# Patient Record
Sex: MALE | Race: BLACK OR AFRICAN AMERICAN | HISPANIC OR LATINO | URBAN - METROPOLITAN AREA
[De-identification: names, ages, dates, MRNs, and addresses within clinical notes are randomized per-mention and may not be internally consistent; named-entity substitution may affect disease eponyms.]

---

## 2020-12-18 ENCOUNTER — EMERGENCY (EMERGENCY)
Facility: HOSPITAL | Age: 30
LOS: 0 days | Discharge: HOME | End: 2020-12-18
Attending: EMERGENCY MEDICINE | Admitting: EMERGENCY MEDICINE
Payer: COMMERCIAL

## 2020-12-18 VITALS
DIASTOLIC BLOOD PRESSURE: 83 MMHG | SYSTOLIC BLOOD PRESSURE: 135 MMHG | RESPIRATION RATE: 14 BRPM | OXYGEN SATURATION: 96 % | TEMPERATURE: 99 F | HEART RATE: 90 BPM

## 2020-12-18 DIAGNOSIS — M79.10 MYALGIA, UNSPECIFIED SITE: ICD-10-CM

## 2020-12-18 DIAGNOSIS — R51.9 HEADACHE, UNSPECIFIED: ICD-10-CM

## 2020-12-18 DIAGNOSIS — R05 COUGH: ICD-10-CM

## 2020-12-18 DIAGNOSIS — Z20.828 CONTACT WITH AND (SUSPECTED) EXPOSURE TO OTHER VIRAL COMMUNICABLE DISEASES: ICD-10-CM

## 2020-12-18 PROCEDURE — 99284 EMERGENCY DEPT VISIT MOD MDM: CPT

## 2020-12-18 PROCEDURE — 71045 X-RAY EXAM CHEST 1 VIEW: CPT | Mod: 26

## 2020-12-18 NOTE — ED ADULT TRIAGE NOTE - CHIEF COMPLAINT QUOTE
Pt reports being at a get together aprrox. 1 week ago. Reports headache, dry cough, intermittent cough. Denies fever, nausea, vomiting, diarrhea, chest pain, blurry vision, and weakness. Walks with steady gait.

## 2020-12-18 NOTE — ED PROVIDER NOTE - ATTENDING CONTRIBUTION TO CARE
Patient is c/o cough, states was exposed to COVID-19, denies cp/sob/n/v/abd pain. +body aches and mild headache. Denies Neck pain, denies dizziness, denies any other symptoms.   Vitals reviewed.   patient is awake, alert, o x 3, speaking in full sentences, appears comfortable and is not in distress.   lungs: CTA, no crackles  abd: +BS, NT, ND, soft  CNS: awake, alert, o x 3, no focal neurologic deficits  No meningeal signs  A/P: URI  CXR to evaluate for pneumonia  COVID-19 test  reevaluation.

## 2020-12-18 NOTE — ED PROVIDER NOTE - NSFOLLOWUPCLINICS_GEN_ALL_ED_FT
[facility]  Medicine  [address], [place]   Phone: [phone]  Fax:   Follow Up Time: 4-6 Days

## 2020-12-18 NOTE — ED PROVIDER NOTE - PHYSICAL EXAMINATION
Vital Signs: I have reviewed the initial vital signs.  Constitutional: well-nourished, appears stated age, no acute distress.  HEENT: Airway patent, moist MM, no erythema/swelling/deformity of oral structures.   CV: regular rate, regular rhythm, well-perfused extremities, 2+ b/l DP and radial pulses equal.  Lungs: BCTA, no increased WOB.  ABD: NTND, no guarding or rebound, no pulsatile mass, no hernias.   MSK: Ext nontender, nl rom, no deformity.   INTEG: Skin warm, dry, no rash.  NEURO: A&Ox3, moving all extremities, normal speech. ambulatory, nonfocal.   PSYCH: Calm, cooperative, normal affect and interaction.

## 2020-12-18 NOTE — ED PROVIDER NOTE - NS ED ROS FT
Constitutional: (-) fever  Eyes/ENT: (-) blurry vision, (-) epistaxis  Cardiovascular: (-) chest pain, (-) syncope  Respiratory: (+) cough, (-) shortness of breath  Gastrointestinal: (-) vomiting, (-) diarrhea  Musculoskeletal: (-) neck pain, (-) back pain, (-) joint pain  Integumentary: (-) rash, (-) edema  Neurological: (+) headache, (-) altered mental status  Psychiatric: (-) hallucinations  Allergic/Immunologic: (-) pruritus

## 2020-12-18 NOTE — ED PROVIDER NOTE - OBJECTIVE STATEMENT
30M no pmhx presents with headache/cough. patient went to a hotel party 1 week ago, noted that he started developing dry cough 2 days ago, and slow onset headache yesterday, does not normally have headaches, denies chest pain/sob/vomiting/diarrhea/weakness/paresthesias/vision changes. Patient lives at home with siblings/parents, has been staying in basement.

## 2020-12-18 NOTE — ED PROVIDER NOTE - PATIENT PORTAL LINK FT
You can access the FollowMyHealth Patient Portal offered by [facility] by registering at the following website: [email or web address]. By joining [facility]’s FollowMyHealth portal, you will also be able to view your health information using other applications (apps) compatible with our system.

## 2020-12-19 LAB — SARS-COV-2 RNA SPEC QL NAA+PROBE: SIGNIFICANT CHANGE UP

## 2021-03-18 ENCOUNTER — EMERGENCY (EMERGENCY)
Facility: HOSPITAL | Age: 31
LOS: 0 days | Discharge: HOME | End: 2021-03-18
Attending: EMERGENCY MEDICINE | Admitting: EMERGENCY MEDICINE
Payer: COMMERCIAL

## 2021-03-18 VITALS
TEMPERATURE: 98 F | OXYGEN SATURATION: 98 % | HEART RATE: 71 BPM | DIASTOLIC BLOOD PRESSURE: 75 MMHG | RESPIRATION RATE: 18 BRPM | SYSTOLIC BLOOD PRESSURE: 131 MMHG

## 2021-03-18 VITALS
SYSTOLIC BLOOD PRESSURE: 143 MMHG | RESPIRATION RATE: 19 BRPM | HEART RATE: 83 BPM | WEIGHT: 250 LBS | HEIGHT: 67 IN | DIASTOLIC BLOOD PRESSURE: 83 MMHG | TEMPERATURE: 99 F | OXYGEN SATURATION: 95 %

## 2021-03-18 DIAGNOSIS — F17.200 NICOTINE DEPENDENCE, UNSPECIFIED, UNCOMPLICATED: ICD-10-CM

## 2021-03-18 DIAGNOSIS — Z20.822 CONTACT WITH AND (SUSPECTED) EXPOSURE TO COVID-19: ICD-10-CM

## 2021-03-18 DIAGNOSIS — R05 COUGH: ICD-10-CM

## 2021-03-18 DIAGNOSIS — R06.02 SHORTNESS OF BREATH: ICD-10-CM

## 2021-03-18 PROCEDURE — 99284 EMERGENCY DEPT VISIT MOD MDM: CPT

## 2021-03-18 NOTE — ED PROVIDER NOTE - PATIENT PORTAL LINK FT
You can access the FollowMyHealth Patient Portal offered by [facility] by registering at the following website: [email or web address]. By joining [facility]’s FollowMyHealth portal, you will also be able to view your health information using other applications (apps) compatible with our system.

## 2021-03-18 NOTE — ED PROVIDER NOTE - PROGRESS NOTE DETAILS
patient is well appearing. no respiratory symptoms. consult patient's regarding COVID-19 symptoms. Encourage patient to self quarantined for the next 2 weeks. encourage return for any  concerning and worsening symptoms.

## 2021-03-18 NOTE — ED PROVIDER NOTE - NSFOLLOWUPINSTRUCTIONS_ED_ALL_ED_FT
Information for COVID-19    You are being discharged with viral illness diagnosis and do not require hospitalization.  At this time, only patients who are being hospitalized are tested for COVID-19.    If you are well enough to be discharged home and are not in a high risk group to be admitted, you should care for yourself at home exactly like you would if you have Influenza “flu”. Follow all the standard guidelines about washing your hands, covering your cough, etc. If you feel unwell, stay home, rest and drink plenty of clear fluids. Keep track of your symptoms.    You do need to remain home for at least 7 days from the onset of symptoms or 3 days after your fever is completely gone and your respiratory symptoms are better, whichever is longer. You should continue to isolate yourself.     If symptoms worsen or continue and you need to seek medical care, call your healthcare provider in advance, or 9-1-1 in an emergency, and let them know you are a close contact to a person with confirmed COVID-19    You should return to the Emergency Department if you develop worse symptoms, trouble breathing, chest pain, and/or a fever that doesn’t improve with over the counter medications.    Please consider going through the drive-through testing unless you are severely ill and need to go to the ED.    -through testing is available at various location, including [place].  Call [facility] at  [phone] to make an appointment.    How to Set Up Your Home for Self-Quarantine or Self-Isolation    Please refer to this helpful video.    [email or web address]

## 2021-03-18 NOTE — ED PROVIDER NOTE - OBJECTIVE STATEMENT
30 yo male no sig hx present c/o 40 present c/o cough/sob x 3 days. + Sick contact and friend just tested positive for COVID this ma. denies fever/chill/HA/dizziness/chest pain/abd pain/n/v/d/ inary sxs. Denies recent travel

## 2021-03-18 NOTE — ED PROVIDER NOTE - ATTENDING CONTRIBUTION TO CARE
I personally evaluated the patient. I reviewed the Resident’s or Physician Assistant’s note (as assigned above), and agree with the findings and plan except as documented in my note.  31 M, with no pmhx, + smoker, with [id number] of 3 days of coughing and associated SOB. Denies fever, CP, LOC. + sick contact in friend who he spent in door time with that was diagnosed with Covid today. VS reviewed, talking comfortably in full sentences, pt non-toxic appearing, NAD. Head ncat, MMM, pharyngeal exam w/o erythema, edema or exudates. B/l TM wnl. neck supple, normal ROM, normal s1s2 without any murmurs, Lungs CTAB with normal work of breathing. abd +BS, s/nd/nt, extremities wnl, neuro exam grossly normal. No acute skin rashes. Plan is covid testing and reassess.

## 2021-03-18 NOTE — ED ADULT NURSE NOTE - OBJECTIVE STATEMENT
pt complaining of coughing for a few days with some shortness of breath. pt denies any chest pain. sts he was exposed to a friend that was covid +. pt appears well and in no distress.

## 2021-03-18 NOTE — ED PROVIDER NOTE - PHYSICAL EXAMINATION
CONSTITUTIONAL: Well-appearing; well-nourished; in no apparent distress.   EYES: PERRL; EOM intact.   ENT: normal nose; no rhinorrhea; normal pharynx with no tonsillar hypertrophy.   NECK: no cervical lymphadenopathy. No JVD.   CARDIOVASCULAR: Normal S1, S2; no murmurs, rubs, or gallops.   RESPIRATORY: Normal chest excursion with respiration; breath sounds clear and equal bilaterally; no wheezes, rhonchi, or rales.  GI/: Normal bowel sounds; non-distended; non-tender; no palpable organomegaly.   MS: No evidence of trauma or deformity. Non-tender to palpation. No scoliosis. No CVA tenderness. Normal ROM in all four extremities; non-tender to palpation; distal pulses are normal.   SKIN: Normal for age and race; warm; dry; good turgor; no apparent lesions or exudate.   NEURO/PSYCH: A & O x 4; grossly unremarkable.

## 2021-03-18 NOTE — ED PROVIDER NOTE - CLINICAL SUMMARY MEDICAL DECISION MAKING FREE TEXT BOX
Pt presented seeking a covid test due to positive contact. Complaining of coughing and SOB. Testing was done. Will d/c pending results.     ED evaluation and management discussed with the patient and family (if available) in detail.  Close PMD follow up encouraged.  Strict ED return instructions discussed in detail and patient given the opportunity to ask any questions about their discharge diagnosis and instructions. Patient verbalized understanding.

## 2021-03-18 NOTE — ED PROVIDER NOTE - NS ED ROS FT
Constitutional: No fever, chills, no recent weight loss, change in appetite or malaise  Eyes: no redness/discharge/pain/vision changes  ENT: no rhinorrhea/ear pain/sore throat  Cardiac: No chest pain, SOB or edema.  Respiratory: see HPI   GI: No vomiting, diarrhea No nausea and abdominal pain.  : No dysuria, frequency, urgency or hematuria  MS: No myalgia. no pain to back or extremities, no loss of ROM, no weakness  Neuro: No headache or weakness. No LOC.  Skin: No skin rash.  Endocrine: No history of thyroid disease or diabetes.

## 2021-03-19 LAB — SARS-COV-2 RNA SPEC QL NAA+PROBE: SIGNIFICANT CHANGE UP

## 2023-05-28 ENCOUNTER — INPATIENT (INPATIENT)
Facility: HOSPITAL | Age: 33
LOS: 2 days | Discharge: HOME | DRG: 392 | End: 2023-05-31
Attending: SURGERY | Admitting: SURGERY
Payer: COMMERCIAL

## 2023-05-28 VITALS
OXYGEN SATURATION: 98 % | RESPIRATION RATE: 18 BRPM | TEMPERATURE: 98 F | SYSTOLIC BLOOD PRESSURE: 129 MMHG | HEART RATE: 72 BPM | DIASTOLIC BLOOD PRESSURE: 60 MMHG | WEIGHT: 235.01 LBS

## 2023-05-28 DIAGNOSIS — K57.92 DIVERTICULITIS OF INTESTINE, PART UNSPECIFIED, WITHOUT PERFORATION OR ABSCESS WITHOUT BLEEDING: ICD-10-CM

## 2023-05-28 LAB
ALBUMIN SERPL ELPH-MCNC: 4.8 G/DL — SIGNIFICANT CHANGE UP (ref 3.5–5.2)
ALP SERPL-CCNC: 95 U/L — SIGNIFICANT CHANGE UP (ref 30–115)
ALT FLD-CCNC: 52 U/L — HIGH (ref 0–41)
ANION GAP SERPL CALC-SCNC: 8 MMOL/L — SIGNIFICANT CHANGE UP (ref 7–14)
APPEARANCE UR: CLEAR — SIGNIFICANT CHANGE UP
AST SERPL-CCNC: 27 U/L — SIGNIFICANT CHANGE UP (ref 0–41)
BASOPHILS # BLD AUTO: 0.05 K/UL — SIGNIFICANT CHANGE UP (ref 0–0.2)
BASOPHILS NFR BLD AUTO: 0.4 % — SIGNIFICANT CHANGE UP (ref 0–1)
BILIRUB DIRECT SERPL-MCNC: 0.3 MG/DL — SIGNIFICANT CHANGE UP (ref 0–0.3)
BILIRUB INDIRECT FLD-MCNC: 1.3 MG/DL — HIGH (ref 0.2–1.2)
BILIRUB SERPL-MCNC: 1.6 MG/DL — HIGH (ref 0.2–1.2)
BILIRUB UR-MCNC: NEGATIVE — SIGNIFICANT CHANGE UP
BUN SERPL-MCNC: 10 MG/DL — SIGNIFICANT CHANGE UP (ref 10–20)
CALCIUM SERPL-MCNC: 9.5 MG/DL — SIGNIFICANT CHANGE UP (ref 8.4–10.5)
CHLORIDE SERPL-SCNC: 101 MMOL/L — SIGNIFICANT CHANGE UP (ref 98–110)
CO2 SERPL-SCNC: 27 MMOL/L — SIGNIFICANT CHANGE UP (ref 17–32)
COLOR SPEC: YELLOW — SIGNIFICANT CHANGE UP
CREAT SERPL-MCNC: 1 MG/DL — SIGNIFICANT CHANGE UP (ref 0.7–1.5)
DIFF PNL FLD: NEGATIVE — SIGNIFICANT CHANGE UP
EGFR: 102 ML/MIN/1.73M2 — SIGNIFICANT CHANGE UP
EOSINOPHIL # BLD AUTO: 0.06 K/UL — SIGNIFICANT CHANGE UP (ref 0–0.7)
EOSINOPHIL NFR BLD AUTO: 0.5 % — SIGNIFICANT CHANGE UP (ref 0–8)
GLUCOSE SERPL-MCNC: 99 MG/DL — SIGNIFICANT CHANGE UP (ref 70–99)
GLUCOSE UR QL: NEGATIVE — SIGNIFICANT CHANGE UP
HCT VFR BLD CALC: 46.7 % — SIGNIFICANT CHANGE UP (ref 42–52)
HGB BLD-MCNC: 15.6 G/DL — SIGNIFICANT CHANGE UP (ref 14–18)
IMM GRANULOCYTES NFR BLD AUTO: 0.5 % — HIGH (ref 0.1–0.3)
KETONES UR-MCNC: NEGATIVE — SIGNIFICANT CHANGE UP
LACTATE SERPL-SCNC: 1.3 MMOL/L — SIGNIFICANT CHANGE UP (ref 0.7–2)
LEUKOCYTE ESTERASE UR-ACNC: NEGATIVE — SIGNIFICANT CHANGE UP
LIDOCAIN IGE QN: 14 U/L — SIGNIFICANT CHANGE UP (ref 7–60)
LYMPHOCYTES # BLD AUTO: 1.59 K/UL — SIGNIFICANT CHANGE UP (ref 1.2–3.4)
LYMPHOCYTES # BLD AUTO: 11.9 % — LOW (ref 20.5–51.1)
MCHC RBC-ENTMCNC: 31.6 PG — HIGH (ref 27–31)
MCHC RBC-ENTMCNC: 33.4 G/DL — SIGNIFICANT CHANGE UP (ref 32–37)
MCV RBC AUTO: 94.5 FL — HIGH (ref 80–94)
MONOCYTES # BLD AUTO: 0.93 K/UL — HIGH (ref 0.1–0.6)
MONOCYTES NFR BLD AUTO: 7 % — SIGNIFICANT CHANGE UP (ref 1.7–9.3)
NEUTROPHILS # BLD AUTO: 10.61 K/UL — HIGH (ref 1.4–6.5)
NEUTROPHILS NFR BLD AUTO: 79.7 % — HIGH (ref 42.2–75.2)
NITRITE UR-MCNC: NEGATIVE — SIGNIFICANT CHANGE UP
NRBC # BLD: 0 /100 WBCS — SIGNIFICANT CHANGE UP (ref 0–0)
PH UR: 6.5 — SIGNIFICANT CHANGE UP (ref 5–8)
PLATELET # BLD AUTO: 216 K/UL — SIGNIFICANT CHANGE UP (ref 130–400)
PMV BLD: 11.7 FL — HIGH (ref 7.4–10.4)
POTASSIUM SERPL-MCNC: 4.6 MMOL/L — SIGNIFICANT CHANGE UP (ref 3.5–5)
POTASSIUM SERPL-SCNC: 4.6 MMOL/L — SIGNIFICANT CHANGE UP (ref 3.5–5)
PROT SERPL-MCNC: 7.9 G/DL — SIGNIFICANT CHANGE UP (ref 6–8)
PROT UR-MCNC: SIGNIFICANT CHANGE UP
RBC # BLD: 4.94 M/UL — SIGNIFICANT CHANGE UP (ref 4.7–6.1)
RBC # FLD: 12.9 % — SIGNIFICANT CHANGE UP (ref 11.5–14.5)
SODIUM SERPL-SCNC: 136 MMOL/L — SIGNIFICANT CHANGE UP (ref 135–146)
SP GR SPEC: 1.02 — SIGNIFICANT CHANGE UP (ref 1.01–1.03)
UROBILINOGEN FLD QL: SIGNIFICANT CHANGE UP
WBC # BLD: 13.31 K/UL — HIGH (ref 4.8–10.8)
WBC # FLD AUTO: 13.31 K/UL — HIGH (ref 4.8–10.8)

## 2023-05-28 PROCEDURE — 84100 ASSAY OF PHOSPHORUS: CPT

## 2023-05-28 PROCEDURE — 83735 ASSAY OF MAGNESIUM: CPT

## 2023-05-28 PROCEDURE — 80048 BASIC METABOLIC PNL TOTAL CA: CPT

## 2023-05-28 PROCEDURE — 36415 COLL VENOUS BLD VENIPUNCTURE: CPT

## 2023-05-28 PROCEDURE — 85025 COMPLETE CBC W/AUTO DIFF WBC: CPT

## 2023-05-28 PROCEDURE — 99285 EMERGENCY DEPT VISIT HI MDM: CPT

## 2023-05-28 PROCEDURE — 74177 CT ABD & PELVIS W/CONTRAST: CPT | Mod: 26,MA

## 2023-05-28 PROCEDURE — 99222 1ST HOSP IP/OBS MODERATE 55: CPT

## 2023-05-28 RX ORDER — MORPHINE SULFATE 50 MG/1
4 CAPSULE, EXTENDED RELEASE ORAL ONCE
Refills: 0 | Status: DISCONTINUED | OUTPATIENT
Start: 2023-05-28 | End: 2023-05-28

## 2023-05-28 RX ORDER — NALOXONE HYDROCHLORIDE 4 MG/.1ML
0.4 SPRAY NASAL ONCE
Refills: 0 | Status: DISCONTINUED | OUTPATIENT
Start: 2023-05-28 | End: 2023-05-31

## 2023-05-28 RX ORDER — SODIUM CHLORIDE 9 MG/ML
1000 INJECTION, SOLUTION INTRAVENOUS
Refills: 0 | Status: DISCONTINUED | OUTPATIENT
Start: 2023-05-28 | End: 2023-05-28

## 2023-05-28 RX ORDER — OXYCODONE HYDROCHLORIDE 5 MG/1
5 TABLET ORAL EVERY 4 HOURS
Refills: 0 | Status: DISCONTINUED | OUTPATIENT
Start: 2023-05-28 | End: 2023-05-28

## 2023-05-28 RX ORDER — ONDANSETRON 8 MG/1
4 TABLET, FILM COATED ORAL ONCE
Refills: 0 | Status: COMPLETED | OUTPATIENT
Start: 2023-05-28 | End: 2023-05-28

## 2023-05-28 RX ORDER — ENOXAPARIN SODIUM 100 MG/ML
40 INJECTION SUBCUTANEOUS EVERY 24 HOURS
Refills: 0 | Status: DISCONTINUED | OUTPATIENT
Start: 2023-05-28 | End: 2023-05-31

## 2023-05-28 RX ORDER — SODIUM CHLORIDE 9 MG/ML
1000 INJECTION, SOLUTION INTRAVENOUS
Refills: 0 | Status: DISCONTINUED | OUTPATIENT
Start: 2023-05-28 | End: 2023-05-29

## 2023-05-28 RX ORDER — PIPERACILLIN AND TAZOBACTAM 4; .5 G/20ML; G/20ML
3.38 INJECTION, POWDER, LYOPHILIZED, FOR SOLUTION INTRAVENOUS EVERY 8 HOURS
Refills: 0 | Status: DISCONTINUED | OUTPATIENT
Start: 2023-05-28 | End: 2023-05-31

## 2023-05-28 RX ORDER — KETOROLAC TROMETHAMINE 30 MG/ML
30 SYRINGE (ML) INJECTION EVERY 8 HOURS
Refills: 0 | Status: DISCONTINUED | OUTPATIENT
Start: 2023-05-28 | End: 2023-05-30

## 2023-05-28 RX ORDER — SODIUM CHLORIDE 9 MG/ML
1000 INJECTION, SOLUTION INTRAVENOUS ONCE
Refills: 0 | Status: COMPLETED | OUTPATIENT
Start: 2023-05-28 | End: 2023-05-28

## 2023-05-28 RX ORDER — ACETAMINOPHEN 500 MG
1000 TABLET ORAL EVERY 8 HOURS
Refills: 0 | Status: DISCONTINUED | OUTPATIENT
Start: 2023-05-28 | End: 2023-05-30

## 2023-05-28 RX ORDER — METRONIDAZOLE 500 MG
500 TABLET ORAL ONCE
Refills: 0 | Status: COMPLETED | OUTPATIENT
Start: 2023-05-28 | End: 2023-05-28

## 2023-05-28 RX ORDER — CIPROFLOXACIN LACTATE 400MG/40ML
400 VIAL (ML) INTRAVENOUS ONCE
Refills: 0 | Status: COMPLETED | OUTPATIENT
Start: 2023-05-28 | End: 2023-05-28

## 2023-05-28 RX ORDER — PIPERACILLIN AND TAZOBACTAM 4; .5 G/20ML; G/20ML
3.38 INJECTION, POWDER, LYOPHILIZED, FOR SOLUTION INTRAVENOUS ONCE
Refills: 0 | Status: COMPLETED | OUTPATIENT
Start: 2023-05-28 | End: 2023-05-28

## 2023-05-28 RX ADMIN — Medication 1000 MILLIGRAM(S): at 22:26

## 2023-05-28 RX ADMIN — PIPERACILLIN AND TAZOBACTAM 25 GRAM(S): 4; .5 INJECTION, POWDER, LYOPHILIZED, FOR SOLUTION INTRAVENOUS at 22:25

## 2023-05-28 RX ADMIN — ONDANSETRON 4 MILLIGRAM(S): 8 TABLET, FILM COATED ORAL at 11:34

## 2023-05-28 RX ADMIN — Medication 200 MILLIGRAM(S): at 15:32

## 2023-05-28 RX ADMIN — PIPERACILLIN AND TAZOBACTAM 200 GRAM(S): 4; .5 INJECTION, POWDER, LYOPHILIZED, FOR SOLUTION INTRAVENOUS at 17:11

## 2023-05-28 RX ADMIN — SODIUM CHLORIDE 1000 MILLILITER(S): 9 INJECTION, SOLUTION INTRAVENOUS at 11:34

## 2023-05-28 RX ADMIN — Medication 100 MILLIGRAM(S): at 14:08

## 2023-05-28 RX ADMIN — MORPHINE SULFATE 4 MILLIGRAM(S): 50 CAPSULE, EXTENDED RELEASE ORAL at 11:35

## 2023-05-28 RX ADMIN — Medication 1000 MILLIGRAM(S): at 22:50

## 2023-05-28 NOTE — H&P ADULT - NSHPPHYSICALEXAM_GEN_ALL_CORE
VITALS:  T(F): 98.4 (05-28-23 @ 15:36), Max: 98.4 (05-28-23 @ 15:36)  HR: 60 (05-28-23 @ 15:36) (60 - 72)  BP: 142/74 (05-28-23 @ 15:36) (129/60 - 142/74)  RR: 18 (05-28-23 @ 15:36) (18 - 18)  SpO2: 98% (05-28-23 @ 15:36) (98% - 98%)    PHYSICAL EXAM:  General: NAD, AAOx3, calm and cooperative. Non toxic.   HEENT: NCATEOMI, Trachea ML, Neck supple  Cardiac: extremities well perfused   Respiratory: Normal respiratory effort, on room air   Abdomen: Soft, mild distension, very mild tenderness to palpation in lower pelvis and LLQ. No rebound tenderness or voluntary guarding. No masses or HSM  Musculoskeletal: compartments soft  Neuro: no focal deficits  Vascular: Pulses 2+ throughout, extremities well perfused  Skin: Warm/dry, normal color, no jaundice

## 2023-05-28 NOTE — ED PROVIDER NOTE - INTERNATIONAL TRAVEL
[facility]  Progress Note    [facility] Patient Status:  Inpatient    1949 MRN [id number]   [facility] 3NW-A Attending Ceasar Duarte MD   Hosp Day # 25 PCP Jeremy Campos MD     Subjective:  Patient states he wishes to go No

## 2023-05-28 NOTE — ED ADULT NURSE NOTE - NSFALLUNIVINTERV_ED_ALL_ED
Bed/Stretcher in lowest position, wheels locked, appropriate side rails in place/Call bell, personal items and telephone in reach/Instruct patient to call for assistance before getting out of bed/chair/stretcher/Non-slip footwear applied when patient is off stretcher/[place] to call system/Physically safe environment - no spills, clutter or unnecessary equipment/Purposeful proactive rounding/Room/bathroom lighting operational, light cord in reach

## 2023-05-28 NOTE — ED ADULT NURSE NOTE - OBJECTIVE STATEMENT
Pt presents to the ED c/o rectal pain since last night and abdominal pain. Pt states that he has hx of diverticulitis. No N/V.

## 2023-05-28 NOTE — ED ADULT NURSE REASSESSMENT NOTE - NSFALLUNIVINTERV_ED_ALL_ED
Bed/Stretcher in lowest position, wheels locked, appropriate side rails in place/Call bell, personal items and telephone in reach/Instruct patient to call for assistance before getting out of bed/chair/stretcher/Non-slip footwear applied when patient is off stretcher/[place] to call system/Physically safe environment - no spills, clutter or unnecessary equipment/Purposeful proactive rounding/Room/bathroom lighting operational, light cord in reach

## 2023-05-28 NOTE — ED PROVIDER NOTE - PHYSICAL EXAMINATION
Physical Exam    Vital Signs: I have reviewed the initial vital signs.  Constitutional: appears stated age, no acute distress  Eyes: Conjunctiva pink, Sclera clear, PERRLA, EOMI.  Cardiovascular: S1 and S2, regular rate, regular rhythm, well-perfused extremities, radial pulses equal and 2+, pedal pulses 2+ and equal  Respiratory: unlabored respiratory effort, clear to auscultation bilaterally no wheezing, rales and rhonchi  Gastrointestinal: soft, + LLQ ttp no pulsatile mass, normal bowl sounds  Musculoskeletal: supple neck, no lower extremity edema, no midline tenderness  Integumentary: warm, dry, no rash  Neurologic: awake, alert, nvi

## 2023-05-28 NOTE — ED ADULT TRIAGE NOTE - CHIEF COMPLAINT QUOTE
pt with c/o abdominal pain and bloating with diarrhea and rectal pain. pt has a hx of diverticulitis.

## 2023-05-28 NOTE — ED ADULT NURSE REASSESSMENT NOTE - NS ED NURSE REASSESS COMMENT FT1
Pt reassessed. Pt updated on plan of care. No complaints at this time. IV ATB infusing. Will update as needed.

## 2023-05-28 NOTE — ED PROVIDER NOTE - OBJECTIVE STATEMENT
32 yo male, pmh of diverticulitis, p/w llq pain since last night, similar to diverticulitis, mild, aching, no radiation. Denies fever, chills, cp, sob, nvd, dysuria, hematuria.

## 2023-05-28 NOTE — H&P ADULT - HISTORY OF PRESENT ILLNESS
GENERAL SURGERY CONSULT NOTE    Patient: NHAN KAUR , 33y ([birth year])Male   MRN: [id number]  Location: [facility] ED Hold 006 A  Visit: 05-28-23 Inpatient  Date: 05-28-23 @ 16:08    HPI: Nhan Kaur is a 32 y/o male with PMHx of sigmoid diverticulitis 1 year ago, who presents with acute onset LLQ pain and rectal pain w/ spasms.   The patient was hospitalized at [facility] in [place] one year ago for acute sigmoid diverticulitis. He was admitted for 3-4 days for IV ABX, pain control, and slow diet advancement. No surgery or drainage procedure at that time. He was discharged on a course of oral ciprofloxacin and flagyl, and afterwards felt much  better. He's never had a colonoscopy.   Admits to eating a lot of red meat as of late. He presents now with acute onset LLQ pain and rectal pain/spasms that started late last evening and have been persistent ever since. Slight nausea, but no emesis. Passing gas today. Having BMs, last BM yesterday evening. No blood in stool. Denies fevers/chills. He's been able to keep food down.

## 2023-05-28 NOTE — H&P ADULT - NS ATTEND AMEND GEN_ALL_CORE FT
patient seen. has acute diverticulitis. vitals stable, abdomen soft, mildly tender LLQ. admit to floor. npo, iv fluids. abx.

## 2023-05-28 NOTE — ED PROVIDER NOTE - CONSIDERATION OF ADMISSION OBSERVATION
Consideration of Admission/Observation Patient with diverticulitis, likely developing abscess, phlegmonous changes. WIll require admission for IV abx and possible intervention.

## 2023-05-28 NOTE — H&P ADULT - NSHPLABSRESULTS_GEN_ALL_CORE
LAB/STUDIES:             15.6   13.31 )-----------( 216      ( 28 May 2023 10:53 )             46.7       136  |  101  |  10  ----------------------------<  99  4.6   |  27  |  1.0    Ca    9.5      28 May 2023 10:53    TPro  7.9  /  Alb  4.8  /  TBili  1.6<H>  /  DBili  0.3  /  AST  27  /  ALT  52<H>  /  AlkPhos  95      LIVER FUNCTIONS - ( 28 May 2023 10:53 )  Alb: 4.8 g/dL / Pro: 7.9 g/dL / ALK PHOS: 95 U/L / ALT: 52 U/L / AST: 27 U/L / GGT: x           Urinalysis Basic - ( 28 May 2023 10:56 )  Color: Yellow / Appearance: Clear / S.024 / pH: x  Gluc: x / Ketone: Negative  / Bili: Negative / Urobili: <2 mg/dL   Blood: x / Protein: Trace / Nitrite: Negative   Leuk Esterase: Negative / RBC: x / WBC x   Sq Epi: x / Non Sq Epi: x / Bacteria: x      IMAGING:  < from: CT Abdomen and Pelvis w/ IV Cont (23 @ 12:35) >  IMPRESSION:  Sigmoid diverticulosis. Extensive inflammatory change and wall thickening   of the sigmoid colon compatible with acute diverticulitis. Adjacent   phlegmonous changes. Ill-defined low-attenuation noted within the sigmoid   colon, nonspecific although a developing intramural abscess is not   excluded

## 2023-05-28 NOTE — H&P ADULT - ASSESSMENT
ASSESSMENT:  33yM w/ PMHx of acute sigmoid diverticulitis in 2022 (treated with IV ABX alone),  who presents with acute onset LLQ and rectal pain, and presents to ED with leukocytosis and CT A/P showing acute sigmoid diverticulitis with extensive inflammatory change and phlegmon. Physical exam findings, imaging, and labs as documented above.     #Acute sigmoid diverticulitis with extensive inflammatory change and possible phlegmon. No defined abscess.   -Abdominal exam benign. Passing gas, having BMs.     #Leukocytosis, due to above    #Hx sigmoid diverticulitis in 2022, admitted to [facility] and treated with IV ABX. Discharged on oral cipro/flagyl. No Hx prior colonoscopy.       PLAN:  -admit to general surgery under Dr. Pope   -NPO  -IVF   -Zosyn   -vitals and I&Os q 4 hours   -ambulate as tolerated   -pt does not take any home medications   -check labs this evening   -monitor WBC count   -serial abdominal exams   -no other consults at this time   -discussed possibility of laparoscopic sigmoidectomy at a later time after acute inflammation subsides, schedule as outpatient   -lovenox for DVT PPx     Lines/Tubes: PIV     Above plan discussed with Attending Surgeon Dr. Pope, patient, patient family, and Primary team  05-28-23 @ 16:08

## 2023-05-28 NOTE — PATIENT PROFILE ADULT - FALL HARM RISK - UNIVERSAL INTERVENTIONS
Bed in lowest position, wheels locked, appropriate side rails in place/Call bell, personal items and telephone in reach/Instruct patient to call for assistance before getting out of bed or chair/Non-slip footwear when patient is out of bed/[place] to call system/Physically safe environment - no spills, clutter or unnecessary equipment/Purposeful Proactive Rounding/Room/bathroom lighting operational, light cord in reach

## 2023-05-28 NOTE — ED PROVIDER NOTE - DIFFERENTIAL DIAGNOSIS
Abdominal pain r/o UTI, obstruction, perforation, abscess, appendicitis, ischemia, hepatobiliary abnormality or any other emergent intra-abdominal pathology. Differential Diagnosis

## 2023-05-28 NOTE — ED PROVIDER NOTE - CLINICAL SUMMARY MEDICAL DECISION MAKING FREE TEXT BOX
Patient presented with LLQ abdominal pain since last night. (+) tender on exam but otherwise afebrile, HD stable. Obtained labs which were grossly unremarkable including no significant leukocytosis, anemia, signs of dehydration/HAI, transaminitis or significant electrolyte abnormalities. UA negative for infection. CT abd/pelvis showed (+) diverticulitis with possible developing abscess and phlegmonous changes. Started IV abx and consulted surgery who evaluated patient in the ED - recommending admission to their service for further management. Patient agreeable with plan. HD stable at time of admission.

## 2023-05-29 LAB
ANION GAP SERPL CALC-SCNC: 10 MMOL/L — SIGNIFICANT CHANGE UP (ref 7–14)
ANION GAP SERPL CALC-SCNC: 8 MMOL/L — SIGNIFICANT CHANGE UP (ref 7–14)
BASOPHILS # BLD AUTO: 0.04 K/UL — SIGNIFICANT CHANGE UP (ref 0–0.2)
BASOPHILS # BLD AUTO: 0.05 K/UL — SIGNIFICANT CHANGE UP (ref 0–0.2)
BASOPHILS NFR BLD AUTO: 0.5 % — SIGNIFICANT CHANGE UP (ref 0–1)
BASOPHILS NFR BLD AUTO: 0.5 % — SIGNIFICANT CHANGE UP (ref 0–1)
BUN SERPL-MCNC: 9 MG/DL — LOW (ref 10–20)
BUN SERPL-MCNC: 9 MG/DL — LOW (ref 10–20)
CALCIUM SERPL-MCNC: 9.4 MG/DL — SIGNIFICANT CHANGE UP (ref 8.4–10.5)
CALCIUM SERPL-MCNC: 9.5 MG/DL — SIGNIFICANT CHANGE UP (ref 8.4–10.5)
CHLORIDE SERPL-SCNC: 101 MMOL/L — SIGNIFICANT CHANGE UP (ref 98–110)
CHLORIDE SERPL-SCNC: 101 MMOL/L — SIGNIFICANT CHANGE UP (ref 98–110)
CO2 SERPL-SCNC: 27 MMOL/L — SIGNIFICANT CHANGE UP (ref 17–32)
CO2 SERPL-SCNC: 31 MMOL/L — SIGNIFICANT CHANGE UP (ref 17–32)
CREAT SERPL-MCNC: 0.9 MG/DL — SIGNIFICANT CHANGE UP (ref 0.7–1.5)
CREAT SERPL-MCNC: 1.1 MG/DL — SIGNIFICANT CHANGE UP (ref 0.7–1.5)
EGFR: 116 ML/MIN/1.73M2 — SIGNIFICANT CHANGE UP
EGFR: 91 ML/MIN/1.73M2 — SIGNIFICANT CHANGE UP
EOSINOPHIL # BLD AUTO: 0.1 K/UL — SIGNIFICANT CHANGE UP (ref 0–0.7)
EOSINOPHIL # BLD AUTO: 0.11 K/UL — SIGNIFICANT CHANGE UP (ref 0–0.7)
EOSINOPHIL NFR BLD AUTO: 0.9 % — SIGNIFICANT CHANGE UP (ref 0–8)
EOSINOPHIL NFR BLD AUTO: 1.4 % — SIGNIFICANT CHANGE UP (ref 0–8)
GLUCOSE SERPL-MCNC: 82 MG/DL — SIGNIFICANT CHANGE UP (ref 70–99)
GLUCOSE SERPL-MCNC: 84 MG/DL — SIGNIFICANT CHANGE UP (ref 70–99)
HCT VFR BLD CALC: 43 % — SIGNIFICANT CHANGE UP (ref 42–52)
HCT VFR BLD CALC: 44.4 % — SIGNIFICANT CHANGE UP (ref 42–52)
HGB BLD-MCNC: 14.3 G/DL — SIGNIFICANT CHANGE UP (ref 14–18)
HGB BLD-MCNC: 14.7 G/DL — SIGNIFICANT CHANGE UP (ref 14–18)
IMM GRANULOCYTES NFR BLD AUTO: 0.3 % — SIGNIFICANT CHANGE UP (ref 0.1–0.3)
IMM GRANULOCYTES NFR BLD AUTO: 0.3 % — SIGNIFICANT CHANGE UP (ref 0.1–0.3)
LYMPHOCYTES # BLD AUTO: 1.93 K/UL — SIGNIFICANT CHANGE UP (ref 1.2–3.4)
LYMPHOCYTES # BLD AUTO: 2.3 K/UL — SIGNIFICANT CHANGE UP (ref 1.2–3.4)
LYMPHOCYTES # BLD AUTO: 21.1 % — SIGNIFICANT CHANGE UP (ref 20.5–51.1)
LYMPHOCYTES # BLD AUTO: 24.5 % — SIGNIFICANT CHANGE UP (ref 20.5–51.1)
MAGNESIUM SERPL-MCNC: 2.2 MG/DL — SIGNIFICANT CHANGE UP (ref 1.8–2.4)
MAGNESIUM SERPL-MCNC: 2.2 MG/DL — SIGNIFICANT CHANGE UP (ref 1.8–2.4)
MCHC RBC-ENTMCNC: 31.5 PG — HIGH (ref 27–31)
MCHC RBC-ENTMCNC: 31.7 PG — HIGH (ref 27–31)
MCHC RBC-ENTMCNC: 33.1 G/DL — SIGNIFICANT CHANGE UP (ref 32–37)
MCHC RBC-ENTMCNC: 33.3 G/DL — SIGNIFICANT CHANGE UP (ref 32–37)
MCV RBC AUTO: 95.1 FL — HIGH (ref 80–94)
MCV RBC AUTO: 95.3 FL — HIGH (ref 80–94)
MONOCYTES # BLD AUTO: 0.57 K/UL — SIGNIFICANT CHANGE UP (ref 0.1–0.6)
MONOCYTES # BLD AUTO: 0.89 K/UL — HIGH (ref 0.1–0.6)
MONOCYTES NFR BLD AUTO: 7.2 % — SIGNIFICANT CHANGE UP (ref 1.7–9.3)
MONOCYTES NFR BLD AUTO: 8.2 % — SIGNIFICANT CHANGE UP (ref 1.7–9.3)
NEUTROPHILS # BLD AUTO: 5.21 K/UL — SIGNIFICANT CHANGE UP (ref 1.4–6.5)
NEUTROPHILS # BLD AUTO: 7.52 K/UL — HIGH (ref 1.4–6.5)
NEUTROPHILS NFR BLD AUTO: 66.1 % — SIGNIFICANT CHANGE UP (ref 42.2–75.2)
NEUTROPHILS NFR BLD AUTO: 69 % — SIGNIFICANT CHANGE UP (ref 42.2–75.2)
NRBC # BLD: 0 /100 WBCS — SIGNIFICANT CHANGE UP (ref 0–0)
NRBC # BLD: 0 /100 WBCS — SIGNIFICANT CHANGE UP (ref 0–0)
PHOSPHATE SERPL-MCNC: 3.6 MG/DL — SIGNIFICANT CHANGE UP (ref 2.1–4.9)
PHOSPHATE SERPL-MCNC: 3.7 MG/DL — SIGNIFICANT CHANGE UP (ref 2.1–4.9)
PLATELET # BLD AUTO: 201 K/UL — SIGNIFICANT CHANGE UP (ref 130–400)
PLATELET # BLD AUTO: 206 K/UL — SIGNIFICANT CHANGE UP (ref 130–400)
PMV BLD: 11.7 FL — HIGH (ref 7.4–10.4)
PMV BLD: 12 FL — HIGH (ref 7.4–10.4)
POTASSIUM SERPL-MCNC: 3.9 MMOL/L — SIGNIFICANT CHANGE UP (ref 3.5–5)
POTASSIUM SERPL-MCNC: 4 MMOL/L — SIGNIFICANT CHANGE UP (ref 3.5–5)
POTASSIUM SERPL-SCNC: 3.9 MMOL/L — SIGNIFICANT CHANGE UP (ref 3.5–5)
POTASSIUM SERPL-SCNC: 4 MMOL/L — SIGNIFICANT CHANGE UP (ref 3.5–5)
RBC # BLD: 4.51 M/UL — LOW (ref 4.7–6.1)
RBC # BLD: 4.67 M/UL — LOW (ref 4.7–6.1)
RBC # FLD: 12.5 % — SIGNIFICANT CHANGE UP (ref 11.5–14.5)
RBC # FLD: 12.9 % — SIGNIFICANT CHANGE UP (ref 11.5–14.5)
SODIUM SERPL-SCNC: 138 MMOL/L — SIGNIFICANT CHANGE UP (ref 135–146)
SODIUM SERPL-SCNC: 140 MMOL/L — SIGNIFICANT CHANGE UP (ref 135–146)
WBC # BLD: 10.89 K/UL — HIGH (ref 4.8–10.8)
WBC # BLD: 7.88 K/UL — SIGNIFICANT CHANGE UP (ref 4.8–10.8)
WBC # FLD AUTO: 10.89 K/UL — HIGH (ref 4.8–10.8)
WBC # FLD AUTO: 7.88 K/UL — SIGNIFICANT CHANGE UP (ref 4.8–10.8)

## 2023-05-29 PROCEDURE — 99232 SBSQ HOSP IP/OBS MODERATE 35: CPT

## 2023-05-29 RX ORDER — SODIUM CHLORIDE 9 MG/ML
1000 INJECTION, SOLUTION INTRAVENOUS
Refills: 0 | Status: DISCONTINUED | OUTPATIENT
Start: 2023-05-29 | End: 2023-05-31

## 2023-05-29 RX ADMIN — Medication 1000 MILLIGRAM(S): at 06:02

## 2023-05-29 RX ADMIN — PIPERACILLIN AND TAZOBACTAM 25 GRAM(S): 4; .5 INJECTION, POWDER, LYOPHILIZED, FOR SOLUTION INTRAVENOUS at 06:02

## 2023-05-29 RX ADMIN — SODIUM CHLORIDE 100 MILLILITER(S): 9 INJECTION, SOLUTION INTRAVENOUS at 21:45

## 2023-05-29 RX ADMIN — Medication 1000 MILLIGRAM(S): at 22:15

## 2023-05-29 RX ADMIN — Medication 1000 MILLIGRAM(S): at 21:45

## 2023-05-29 RX ADMIN — SODIUM CHLORIDE 100 MILLILITER(S): 9 INJECTION, SOLUTION INTRAVENOUS at 10:41

## 2023-05-29 RX ADMIN — PIPERACILLIN AND TAZOBACTAM 25 GRAM(S): 4; .5 INJECTION, POWDER, LYOPHILIZED, FOR SOLUTION INTRAVENOUS at 14:07

## 2023-05-29 RX ADMIN — ENOXAPARIN SODIUM 40 MILLIGRAM(S): 100 INJECTION SUBCUTANEOUS at 14:07

## 2023-05-29 RX ADMIN — Medication 1000 MILLIGRAM(S): at 14:07

## 2023-05-29 RX ADMIN — PIPERACILLIN AND TAZOBACTAM 25 GRAM(S): 4; .5 INJECTION, POWDER, LYOPHILIZED, FOR SOLUTION INTRAVENOUS at 21:45

## 2023-05-30 PROCEDURE — 99232 SBSQ HOSP IP/OBS MODERATE 35: CPT

## 2023-05-30 RX ORDER — IBUPROFEN 200 MG
600 TABLET ORAL EVERY 8 HOURS
Refills: 0 | Status: DISCONTINUED | OUTPATIENT
Start: 2023-05-30 | End: 2023-05-31

## 2023-05-30 RX ORDER — ACETAMINOPHEN 500 MG
650 TABLET ORAL EVERY 6 HOURS
Refills: 0 | Status: DISCONTINUED | OUTPATIENT
Start: 2023-05-30 | End: 2023-05-31

## 2023-05-30 RX ADMIN — SODIUM CHLORIDE 100 MILLILITER(S): 9 INJECTION, SOLUTION INTRAVENOUS at 17:56

## 2023-05-30 RX ADMIN — Medication 600 MILLIGRAM(S): at 14:24

## 2023-05-30 RX ADMIN — PIPERACILLIN AND TAZOBACTAM 25 GRAM(S): 4; .5 INJECTION, POWDER, LYOPHILIZED, FOR SOLUTION INTRAVENOUS at 14:22

## 2023-05-30 RX ADMIN — SODIUM CHLORIDE 100 MILLILITER(S): 9 INJECTION, SOLUTION INTRAVENOUS at 06:05

## 2023-05-30 RX ADMIN — Medication 650 MILLIGRAM(S): at 23:37

## 2023-05-30 RX ADMIN — Medication 1000 MILLIGRAM(S): at 06:05

## 2023-05-30 RX ADMIN — PIPERACILLIN AND TAZOBACTAM 25 GRAM(S): 4; .5 INJECTION, POWDER, LYOPHILIZED, FOR SOLUTION INTRAVENOUS at 21:51

## 2023-05-30 RX ADMIN — Medication 1000 MILLIGRAM(S): at 06:08

## 2023-05-30 RX ADMIN — PIPERACILLIN AND TAZOBACTAM 25 GRAM(S): 4; .5 INJECTION, POWDER, LYOPHILIZED, FOR SOLUTION INTRAVENOUS at 06:05

## 2023-05-30 RX ADMIN — Medication 600 MILLIGRAM(S): at 14:50

## 2023-05-30 RX ADMIN — ENOXAPARIN SODIUM 40 MILLIGRAM(S): 100 INJECTION SUBCUTANEOUS at 14:16

## 2023-05-30 NOTE — CONSULT NOTE ADULT - ASSESSMENT
32 y/o male with PMHx of sigmoid diverticulitis 1 year ago, who presents with acute onset LLQ pain and rectal pain w/ spasms.    < from: CT Abdomen and Pelvis w/ IV Cont (05.28.23 @ 12:35) >  Sigmoid diverticulosis. Extensive inflammatory change and wall thickening   of the sigmoid colon compatible with acute diverticulitis. Adjacent   phlegmonous changes. Ill-defined low-attenuation noted within the sigmoid   colon, nonspecific although a developing intramural abscess is not   excluded    IMPRESSION/RECOMMENDATIONS  Acute Sigmoid diverticulosis with diverticulitis with phlegmonous changes with a possible developing intramural abscess.  Clinically no ongoing peritonitis  WBC 10.8  -po Levoquin 750 mg q24h and po Flagyl 500 mg q8h for 14 more days

## 2023-05-30 NOTE — CONSULT NOTE ADULT - SUBJECTIVE AND OBJECTIVE BOX
NHAN KAUR  33y, Male  Allergy: No Known Allergies      All historical available data reviewed.    HPI:  GENERAL SURGERY CONSULT NOTE    Patient: NHAN KAUR , 33y ([birth year])Male   MRN: [id number]  Location: [facility] ED Hold 006 A  Visit: 23 Inpatient  Date: 23 @ 16:08    HPI: Nhan Kaur is a 34 y/o male with PMHx of sigmoid diverticulitis 1 year ago, who presents with acute onset LLQ pain and rectal pain w/ spasms.   The patient was hospitalized at [facility] in [place] one year ago for acute sigmoid diverticulitis. He was admitted for 3-4 days for IV ABX, pain control, and slow diet advancement. No surgery or drainage procedure at that time. He was discharged on a course of oral ciprofloxacin and flagyl, and afterwards felt much  better. He's never had a colonoscopy.   Admits to eating a lot of red meat as of late. He presents now with acute onset LLQ pain and rectal pain/spasms that started late last evening and have been persistent ever since. Slight nausea, but no emesis. Passing gas today. Having BMs, last BM yesterday evening. No blood in stool. Denies fevers/chills. He's been able to keep food down.  (28 May 2023 16:07)    FAMILY HISTORY:  No pertinent family history in first degree relatives      PAST MEDICAL & SURGICAL HISTORY:  Sigmoid diverticulitis      No significant past surgical history            VITALS:  T(F): 97.9, Max: 97.9 (23 @ 04:54)  HR: 65  BP: 121/79  RR: 18Vital Signs Last 24 Hrs  T(C): 36.6 (30 May 2023 04:54), Max: 36.6 (30 May 2023 04:54)  T(F): 97.9 (30 May 2023 04:54), Max: 97.9 (30 May 2023 04:54)  HR: 65 (30 May 2023 04:54) (60 - 66)  BP: 121/79 (30 May 2023 04:54) (116/72 - 133/75)  BP(mean): --  RR: 18 (30 May 2023 04:54) (18 - 18)  SpO2: 98% (30 May 2023 04:54) (96% - 98%)    Parameters below as of 29 May 2023 21:11  Patient On (Oxygen Delivery Method): room air        TESTS & MEASUREMENTS:                        14.7   7.88  )-----------( 206      ( 29 May 2023 21:08 )             44.4     05    138  |  101  |  9<L>  ----------------------------<  82  3.9   |  27  |  0.9    Ca    9.5      29 May 2023 21:08  Phos  3.6       Mg     2.2         TPro  7.9  /  Alb  4.8  /  TBili  1.6<H>  /  DBili  0.3  /  AST  27  /  ALT  52<H>  /  AlkPhos  95      LIVER FUNCTIONS - ( 28 May 2023 10:53 )  Alb: 4.8 g/dL / Pro: 7.9 g/dL / ALK PHOS: 95 U/L / ALT: 52 U/L / AST: 27 U/L / GGT: x             Urinalysis Basic - ( 28 May 2023 10:56 )    Color: Yellow / Appearance: Clear / S.024 / pH: x  Gluc: x / Ketone: Negative  / Bili: Negative / Urobili: <2 mg/dL   Blood: x / Protein: Trace / Nitrite: Negative   Leuk Esterase: Negative / RBC: x / WBC x   Sq Epi: x / Non Sq Epi: x / Bacteria: x          RADIOLOGY & ADDITIONAL TESTS:  Personal review of radiological diagnostics performed  Echo and EKG results noted when applicable.     MEDICATIONS:  acetaminophen     Tablet .. 650 milliGRAM(s) Oral every 6 hours  dextrose 5% + sodium chloride 0.45%. 1000 milliLiter(s) IV Continuous <Continuous>  enoxaparin Injectable 40 milliGRAM(s) SubCutaneous every 24 hours  ibuprofen  Tablet. 600 milliGRAM(s) Oral every 8 hours  naloxone Injectable 0.4 milliGRAM(s) IV Push once  piperacillin/tazobactam IVPB.. 3.375 Gram(s) IV Intermittent every 8 hours      ANTIBIOTICS:  piperacillin/tazobactam IVPB.. 3.375 Gram(s) IV Intermittent every 8 hours

## 2023-05-31 VITALS
OXYGEN SATURATION: 98 % | RESPIRATION RATE: 18 BRPM | HEART RATE: 58 BPM | SYSTOLIC BLOOD PRESSURE: 134 MMHG | TEMPERATURE: 97 F | DIASTOLIC BLOOD PRESSURE: 85 MMHG

## 2023-05-31 LAB
ANION GAP SERPL CALC-SCNC: 10 MMOL/L — SIGNIFICANT CHANGE UP (ref 7–14)
BASOPHILS # BLD AUTO: 0.04 K/UL — SIGNIFICANT CHANGE UP (ref 0–0.2)
BASOPHILS NFR BLD AUTO: 0.8 % — SIGNIFICANT CHANGE UP (ref 0–1)
BUN SERPL-MCNC: 6 MG/DL — LOW (ref 10–20)
CALCIUM SERPL-MCNC: 9.4 MG/DL — SIGNIFICANT CHANGE UP (ref 8.4–10.5)
CHLORIDE SERPL-SCNC: 103 MMOL/L — SIGNIFICANT CHANGE UP (ref 98–110)
CO2 SERPL-SCNC: 28 MMOL/L — SIGNIFICANT CHANGE UP (ref 17–32)
CREAT SERPL-MCNC: 1 MG/DL — SIGNIFICANT CHANGE UP (ref 0.7–1.5)
EGFR: 102 ML/MIN/1.73M2 — SIGNIFICANT CHANGE UP
EOSINOPHIL # BLD AUTO: 0.15 K/UL — SIGNIFICANT CHANGE UP (ref 0–0.7)
EOSINOPHIL NFR BLD AUTO: 2.9 % — SIGNIFICANT CHANGE UP (ref 0–8)
GLUCOSE SERPL-MCNC: 83 MG/DL — SIGNIFICANT CHANGE UP (ref 70–99)
HCT VFR BLD CALC: 44 % — SIGNIFICANT CHANGE UP (ref 42–52)
HGB BLD-MCNC: 15.1 G/DL — SIGNIFICANT CHANGE UP (ref 14–18)
IMM GRANULOCYTES NFR BLD AUTO: 0.2 % — SIGNIFICANT CHANGE UP (ref 0.1–0.3)
LYMPHOCYTES # BLD AUTO: 1.78 K/UL — SIGNIFICANT CHANGE UP (ref 1.2–3.4)
LYMPHOCYTES # BLD AUTO: 34.9 % — SIGNIFICANT CHANGE UP (ref 20.5–51.1)
MAGNESIUM SERPL-MCNC: 2.3 MG/DL — SIGNIFICANT CHANGE UP (ref 1.8–2.4)
MCHC RBC-ENTMCNC: 32.2 PG — HIGH (ref 27–31)
MCHC RBC-ENTMCNC: 34.3 G/DL — SIGNIFICANT CHANGE UP (ref 32–37)
MCV RBC AUTO: 93.8 FL — SIGNIFICANT CHANGE UP (ref 80–94)
MONOCYTES # BLD AUTO: 0.48 K/UL — SIGNIFICANT CHANGE UP (ref 0.1–0.6)
MONOCYTES NFR BLD AUTO: 9.4 % — HIGH (ref 1.7–9.3)
NEUTROPHILS # BLD AUTO: 2.64 K/UL — SIGNIFICANT CHANGE UP (ref 1.4–6.5)
NEUTROPHILS NFR BLD AUTO: 51.8 % — SIGNIFICANT CHANGE UP (ref 42.2–75.2)
NRBC # BLD: 0 /100 WBCS — SIGNIFICANT CHANGE UP (ref 0–0)
PHOSPHATE SERPL-MCNC: 3.7 MG/DL — SIGNIFICANT CHANGE UP (ref 2.1–4.9)
PLATELET # BLD AUTO: 207 K/UL — SIGNIFICANT CHANGE UP (ref 130–400)
PMV BLD: 11.7 FL — HIGH (ref 7.4–10.4)
POTASSIUM SERPL-MCNC: 4.6 MMOL/L — SIGNIFICANT CHANGE UP (ref 3.5–5)
POTASSIUM SERPL-SCNC: 4.6 MMOL/L — SIGNIFICANT CHANGE UP (ref 3.5–5)
RBC # BLD: 4.69 M/UL — LOW (ref 4.7–6.1)
RBC # FLD: 12.4 % — SIGNIFICANT CHANGE UP (ref 11.5–14.5)
SODIUM SERPL-SCNC: 141 MMOL/L — SIGNIFICANT CHANGE UP (ref 135–146)
WBC # BLD: 5.1 K/UL — SIGNIFICANT CHANGE UP (ref 4.8–10.8)
WBC # FLD AUTO: 5.1 K/UL — SIGNIFICANT CHANGE UP (ref 4.8–10.8)

## 2023-05-31 PROCEDURE — 99232 SBSQ HOSP IP/OBS MODERATE 35: CPT

## 2023-05-31 RX ORDER — LEVOFLOXACIN 5 MG/ML
1 INJECTION, SOLUTION INTRAVENOUS
Qty: 14 | Refills: 0
Start: 2023-05-31 | End: 2023-06-13

## 2023-05-31 RX ORDER — METRONIDAZOLE 500 MG
1 TABLET ORAL
Qty: 42 | Refills: 0
Start: 2023-05-31 | End: 2023-06-13

## 2023-05-31 RX ORDER — IBUPROFEN 200 MG
1 TABLET ORAL
Qty: 0 | Refills: 0 | DISCHARGE
Start: 2023-05-31

## 2023-05-31 RX ORDER — METRONIDAZOLE 500 MG
500 TABLET ORAL EVERY 8 HOURS
Refills: 0 | Status: DISCONTINUED | OUTPATIENT
Start: 2023-05-31 | End: 2023-05-31

## 2023-05-31 RX ORDER — LEVOFLOXACIN 5 MG/ML
750 INJECTION, SOLUTION INTRAVENOUS EVERY 24 HOURS
Refills: 0 | Status: DISCONTINUED | OUTPATIENT
Start: 2023-05-31 | End: 2023-05-31

## 2023-05-31 RX ORDER — ACETAMINOPHEN 500 MG
2 TABLET ORAL
Qty: 0 | Refills: 0 | DISCHARGE
Start: 2023-05-31

## 2023-05-31 RX ADMIN — PIPERACILLIN AND TAZOBACTAM 25 GRAM(S): 4; .5 INJECTION, POWDER, LYOPHILIZED, FOR SOLUTION INTRAVENOUS at 05:40

## 2023-05-31 RX ADMIN — Medication 650 MILLIGRAM(S): at 00:00

## 2023-05-31 NOTE — DISCHARGE NOTE PROVIDER - HOSPITAL COURSE
34 y/o male with PMHx of sigmoid diverticulitis 1 year ago, who presents with acute onset LLQ pain and rectal pain w/ spasms.   The patient was hospitalized at [facility] in [place] one year ago for acute sigmoid diverticulitis. He was admitted for 3-4 days for IV ABX, pain control, and slow diet advancement. No surgery or drainage procedure at that time. He was discharged on a course of oral ciprofloxacin and flagyl, and afterwards felt much  better. He's never had a colonoscopy.   Admits to eating a lot of red meat as of late. He presents now with acute onset LLQ pain and rectal pain/spasms that started late last evening and have been persistent ever since. Slight nausea, but no emesis. Passing gas today. Having BMs, last BM yesterday evening. No blood in stool. Denies fevers/chills. He's been able to keep food down.   The patient was admitted for non-operative management of acute diverticulitis, the patient was kept NPO, started on IV antibiotics and IV fluids. The patient's diet was advanced and tolerated. The patient will be discharged to home with 14 days of levaquin and flagyl. The patient will follow up with Dr. Pope as an outpatient and with gastroenterologist for a colonoscopy.

## 2023-05-31 NOTE — PROGRESS NOTE ADULT - SUBJECTIVE AND OBJECTIVE BOX
SURGERY PROGRESS NOTE     Patient: KACI KAUR , 33y ([birth year])Male   MRN: [id number]  Location: [address] (Back) 026 B  Visit: 23 Inpatient  Date: 23 @ 01:15       Events of past 24 hours:  Patient seen resting comfortably in bed. No acute events overnight. Hemodynamically stable.     PAST MEDICAL & SURGICAL HISTORY:  Sigmoid diverticulitis      No significant past surgical history           Vitals:   T(F): 97.1 (23 @ 01:06), Max: 97.4 (23 @ 08:44)  HR: 60 (23 @ 01:06)  BP: 123/78 (23 @ 01:06)  RR: 18 (23 @ 01:06)  SpO2: 96% (23 @ 01:06)      Diet, NPO:   Except Medications      Fluids: dextrose 5% + sodium chloride 0.45%.: Solution, 1000 milliLiter(s) infuse at 100 mL/Hr  Provider's Contact #: [phone]      I & O's:    23 @ 07:01  -  23 @ 07:00  --------------------------------------------------------  IN:    IV PiggyBack: 25 mL  Total IN: 25 mL    OUT:  Total OUT: 0 mL    Total NET: 25 mL           PHYSICAL EXAM:   General: NAD, AAOx3, calm and cooperative  Cardiac: RRR S1, S2  Respiratory: CTAB, normal respiratory effort  Abdomen: Soft, non-distended, non-tender, no rebound, no guarding. +BS.    MEDICATIONS  (STANDING):  acetaminophen     Tablet .. 1000 milliGRAM(s) Oral every 8 hours  dextrose 5% + sodium chloride 0.45%. 1000 milliLiter(s) (100 mL/Hr) IV Continuous <Continuous>  enoxaparin Injectable 40 milliGRAM(s) SubCutaneous every 24 hours  naloxone Injectable 0.4 milliGRAM(s) IV Push once  piperacillin/tazobactam IVPB.. 3.375 Gram(s) IV Intermittent every 8 hours    MEDICATIONS  (PRN):  ketorolac   Injectable 30 milliGRAM(s) IV Push every 8 hours PRN Moderate Pain (4 - 6)      DVT PROPHYLAXIS: enoxaparin Injectable 40 milliGRAM(s) SubCutaneous every 24 hours    GI PROPHYLAXIS:   ANTICOAGULATION:   ANTIBIOTICS:  piperacillin/tazobactam IVPB.. 3.375 Gram(s)            LAB/STUDIES:  Labs:  CAPILLARY BLOOD GLUCOSE                              14.7   7.88  )-----------( 206      ( 29 May 2023 21:08 )             44.4       Auto Neutrophil %: 66.1 % (23 @ 21:08)  Auto Immature Granulocyte %: 0.3 % (23 @ 21:08)        138  |  101  |  9<L>  ----------------------------<  82  3.9   |  27  |  0.9      Calcium, Total Serum: 9.5 mg/dL (23 @ 21:08)      LFTs:             7.9  | 1.6  | 27       ------------------[95      ( 28 May 2023 10:53 )  4.8  | 0.3  | 52          Lipase:14     Amylase:x         Lactate, Blood: 1.3 mmol/L (23 @ 10:53)      Coags:            Urinalysis Basic - ( 28 May 2023 10:56 )    Color: Yellow / Appearance: Clear / S.024 / pH: x  Gluc: x / Ketone: Negative  / Bili: Negative / Urobili: <2 mg/dL   Blood: x / Protein: Trace / Nitrite: Negative   Leuk Esterase: Negative / RBC: x / WBC x   Sq Epi: x / Non Sq Epi: x / Bacteria: x        
GENERAL SURGERY PROGRESS NOTE    Patient: KACI KAUR , 33y ([birth year])Male   MRN: [id number]  Location: [address] (Back) 026 B  Visit: 05-28-23 Inpatient  Date: 05-31-23 @ 02:22    Events of past 24 hours:  NAEON  tolerating clears w/o n/v.  Flatus. BMx2.  Ambulating. Voiding.  Pain controlled.    PAST MEDICAL & SURGICAL HISTORY:  Sigmoid diverticulitis      No significant past surgical history          Vitals:   T(F): 97 (05-31-23 @ 00:42), Max: 98.6 (05-30-23 @ 16:00)  HR: 60 (05-31-23 @ 00:42)  BP: 128/76 (05-31-23 @ 00:42)  RR: 18 (05-31-23 @ 00:42)  SpO2: 100% (05-31-23 @ 00:42)      Diet, Clear Liquid      Fluids:     I & O's:    05-29-23 @ 07:01  -  05-30-23 @ 07:00  --------------------------------------------------------  IN:    dextrose 5% + sodium chloride 0.45%: 500 mL    IV PiggyBack: 150 mL    Lactated Ringers: 110 mL  Total IN: 760 mL    OUT:    Voided (mL): 300 mL  Total OUT: 300 mL    Total NET: 460 mL    PHYSICAL EXAM:  General: NAD, calm and cooperative.  Cardiac: RRR.  Respiratory: On RA. Speaks in complete sentences. No accessory muscles of respiration in use. Bilateral chest rise.  Abdomen: Soft, non-distended, non-tender, no rebound, no guarding.   Neuro: Moves all extremities.  Skin: Warm/dry, normal color, no jaundice.      MEDICATIONS  (STANDING):  acetaminophen     Tablet .. 650 milliGRAM(s) Oral every 6 hours  dextrose 5% + sodium chloride 0.45%. 1000 milliLiter(s) (100 mL/Hr) IV Continuous <Continuous>  enoxaparin Injectable 40 milliGRAM(s) SubCutaneous every 24 hours  ibuprofen  Tablet. 600 milliGRAM(s) Oral every 8 hours  naloxone Injectable 0.4 milliGRAM(s) IV Push once  piperacillin/tazobactam IVPB.. 3.375 Gram(s) IV Intermittent every 8 hours    MEDICATIONS  (PRN):      DVT PROPHYLAXIS: enoxaparin Injectable 40 milliGRAM(s) SubCutaneous every 24 hours    GI PROPHYLAXIS:   ANTICOAGULATION:   ANTIBIOTICS:  piperacillin/tazobactam IVPB.. 3.375 Gram(s)            LAB/STUDIES:  Labs:  CAPILLARY BLOOD GLUCOSE                              14.7   7.88  )-----------( 206      ( 29 May 2023 21:08 )             44.4         05-29    138  |  101  |  9<L>  ----------------------------<  82  3.9   |  27  |  0.9          LFTs:     Lactate, Blood: 1.3 mmol/L (05-28-23 @ 10:53)      Coags:                        IMAGING:  No new imaging.    
GENERAL SURGERY PROGRESS NOTE    Patient: KACI KAUR , 33y (89)Male   MRN: [id number]  Location: [address] (Back) 026 B  Visit: 23 Inpatient  Date: 23 @ 05:19    Hospital Day #: 2    Procedure/Dx/Injuries: Acute sigmoid diverticulitis w/ phlegmon    Events of past 24 hours: Patient denies nausea and emesis. Having flatus and liquid bowel movements (states he took laxatives at home). Voiding, ambulating. Tender RLQ.    PAST MEDICAL & SURGICAL HISTORY:  Sigmoid diverticulitis      No significant past surgical history          Vitals:   T(F): 97 (23 @ 04:41), Max: 98.4 (23 @ 15:36)  HR: 81 (23 @ 04:41)  BP: 125/67 (23 @ 04:41)  RR: 18 (23 @ 04:41)  SpO2: 97% (23 @ 04:41)      Diet, NPO      Fluids: lactated ringers.: Solution, 1000 milliLiter(s) infuse at 110 mL/Hr      I & O's:    Bowel Movement: : [x] YES [] NO  Flatus: : [x] YES [] NO    PHYSICAL EXAM:  General: NAD, AAOx3, calm and cooperative  HEENT: NCAT, PILLO, EOMI, Trachea ML  Cardiac: RRR S1, S2  Respiratory: Normal respiratory effort on RA  Abdomen: Soft, non-distended, non-tender, no rebound, no guarding. +BS.  Musculoskeletal: Mobile, compartments soft  Neuro: Sensation grossly intact and equal throughout, no focal deficits  Vascular: Extremities well perfused  Skin: Warm/dry, normal color, no jaundice    MEDICATIONS  (STANDING):  acetaminophen     Tablet .. 1000 milliGRAM(s) Oral every 8 hours  enoxaparin Injectable 40 milliGRAM(s) SubCutaneous every 24 hours  lactated ringers. 1000 milliLiter(s) (110 mL/Hr) IV Continuous <Continuous>  naloxone Injectable 0.4 milliGRAM(s) IV Push once  piperacillin/tazobactam IVPB.. 3.375 Gram(s) IV Intermittent every 8 hours    MEDICATIONS  (PRN):  ketorolac   Injectable 30 milliGRAM(s) IV Push every 8 hours PRN Moderate Pain (4 - 6)      DVT PROPHYLAXIS: enoxaparin Injectable 40 milliGRAM(s) SubCutaneous every 24 hours    GI PROPHYLAXIS:   ANTICOAGULATION:   ANTIBIOTICS:  piperacillin/tazobactam IVPB.. 3.375 Gram(s)    LAB/STUDIES:  Labs:                        14.3   10.89 )-----------( 201      ( 28 May 2023 20:00 )             43.0       Auto Neutrophil %: 69.0 % (23 @ 20:00)  Auto Immature Granulocyte %: 0.3 % (23 @ 20:00)  Auto Neutrophil %: 79.7 % (23 @ 10:53)  Auto Immature Granulocyte %: 0.5 % (23 @ 10:53)        140  |  101  |  9<L>  ----------------------------<  84  4.0   |  31  |  1.1      Calcium, Total Serum: 9.4 mg/dL (23 @ 20:00)      LFTs:             7.9  | 1.6  | 27       ------------------[95      ( 28 May 2023 10:53 )  4.8  | 0.3  | 52          Lipase:14     Amylase:x         Lactate, Blood: 1.3 mmol/L (23 @ 10:53)      Coags:            Urinalysis Basic - ( 28 May 2023 10:56 )    Color: Yellow / Appearance: Clear / S.024 / pH: x  Gluc: x / Ketone: Negative  / Bili: Negative / Urobili: <2 mg/dL   Blood: x / Protein: Trace / Nitrite: Negative   Leuk Esterase: Negative / RBC: x / WBC x   Sq Epi: x / Non Sq Epi: x / Bacteria: x      IMAGING:  < from: CT Abdomen and Pelvis w/ IV Cont (23 @ 12:35) >  IMPRESSION:  Sigmoid diverticulosis. Extensive inflammatory change and wall thickening   of the sigmoid colon compatible with acute diverticulitis. Adjacent   phlegmonous changes. Ill-defined low-attenuation noted within the sigmoid   colon, nonspecific although a developing intramural abscess is not   excluded    --- End of Report ---  < end of copied text >      · Assessment	  33yM w/ PMH of acute sigmoid diverticulitis in  (treated with IV ABX alone),  who presents with acute onset LLQ and rectal pain, and presents to ED with leukocytosis and CT A/P showing acute sigmoid diverticulitis with extensive inflammatory change and phlegmon. Physical exam findings, imaging, and labs as documented above.     #Acute sigmoid diverticulitis with extensive inflammatory change and possible phlegmon. No defined abscess.   -Abdominal exam benign. Passing gas, having BMs.     #Leukocytosis, due to above    #Hx sigmoid diverticulitis in , admitted to [facility] and treated with IV ABX. Discharged on oral cipro/flagyl. No Hx prior colonoscopy.       PLAN:  - NPO, IVF  - Zosyn   - Hemodynamic monitoring  - Ambulation, IS  - Does not take any home medications   - Serial abdominal exams   -no other consults at this time   - Discussed possibility of laparoscopic sigmoidectomy at a later time after acute inflammation subsides, schedule as outpatient   - Lovenox for DVT PPx     [phone]

## 2023-05-31 NOTE — DISCHARGE NOTE NURSING/CASE MANAGEMENT/SOCIAL WORK - NSDCPEFALRISK_GEN_ALL_CORE
For information on Fall & Injury Prevention, visit: [email or web address] OR  [email or web address] OR  https://www.cdc.gov/steadi/patient.html

## 2023-05-31 NOTE — DISCHARGE NOTE NURSING/CASE MANAGEMENT/SOCIAL WORK - PATIENT PORTAL LINK FT
You can access the FollowMyHealth Patient Portal offered by [facility] by registering at the following website: [email or web address]. By joining [email or web address]’s FollowMyHealth portal, you will also be able to view your health information using other applications (apps) compatible with our system.

## 2023-05-31 NOTE — DISCHARGE NOTE PROVIDER - NSDCCPCAREPLAN_GEN_ALL_CORE_FT
PRINCIPAL DISCHARGE DIAGNOSIS  Diagnosis: Diverticulitis  Assessment and Plan of Treatment: Continue low fiber diet.  Continue 14 day course of levaquin and flagyl, take on a full stomach as this could cause nausea or vomiting. Would recommend starting a probiotic as well (over the counter).   Follow up with a gastroenterologist in 4-6 weeks for a colonoscopy, call to schedule an appointment. Number listed below.  Follow up with Dr. Pope in 1-2 weeks, call to schedule the appointment.

## 2023-05-31 NOTE — DISCHARGE NOTE PROVIDER - NSDCMRMEDTOKEN_GEN_ALL_CORE_FT
acetaminophen 325 mg oral tablet: 2 tab(s) orally every 6 hours  ibuprofen 600 mg oral tablet: 1 tab(s) orally every 8 hours  levoFLOXacin 750 mg oral tablet: 1 tab(s) orally every 24 hours  metroNIDAZOLE 500 mg oral tablet: 1 tab(s) orally every 8 hours

## 2023-05-31 NOTE — PROGRESS NOTE ADULT - ATTENDING COMMENTS
feels better, no abdominal pain. vitals stable. abdomen soft, not tender. will start clears po.
feels better. vitals stable. abdomen soft, less tender LLQ. continue current management.
doing well. vitals stable. abdomen soft, not distended, not tender.   improving diverticulitis. continue abx. diet. possibly home today.
No

## 2023-05-31 NOTE — DISCHARGE NOTE PROVIDER - PROVIDER TOKENS
PROVIDER:[TOKEN:[[id number]],FOLLOWUP:[1 month]],PROVIDER:[TOKEN:[[id number]],FOLLOWUP:[1 week]]

## 2023-05-31 NOTE — PROGRESS NOTE ADULT - REASON FOR ADMISSION
Recurrent acute sigmoid diverticulitis with phlegmon

## 2023-05-31 NOTE — DISCHARGE NOTE PROVIDER - CARE PROVIDER_API CALL
Musa Mena  Gastroenterology  [address] [place] [address]  Phone: [phone]  Fax: [phone]  Follow Up Time: 1 month    Sarita Pope  Surgical Critical Care  [address] [place] [address] [address] [place] [address]  Phone: [phone]  Fax: [phone]  Follow Up Time: 1 week

## 2023-05-31 NOTE — PROGRESS NOTE ADULT - ASSESSMENT
33yM w/ PMH of acute sigmoid diverticulitis in 2022 (treated with IV ABX alone),  who presents with acute onset LLQ and rectal pain, and presents to ED with leukocytosis and CT A/P showing acute sigmoid diverticulitis with extensive inflammatory change and phlegmon. Physical exam findings, imaging, and labs as documented above.     #Acute sigmoid diverticulitis with extensive inflammatory change and possible phlegmon. No defined abscess.   -Abdominal exam benign. Passing gas, having BMs.     #Leukocytosis, due to above    #Hx sigmoid diverticulitis in 2022, admitted to [facility] and treated with IV ABX. Discharged on oral cipro/flagyl. No Hx prior colonoscopy.       PLAN:  - Low fiber diet  - Zosyn   - Hemodynamic monitoring  - Ambulation, IS  - Does not take any home medications   - Serial abdominal exams   - no other consults at this time   - Discussed possibility of laparoscopic sigmoidectomy at a later time after acute inflammation subsides, schedule as outpatient   - Lovenox for DVT PPx     Trauma/ACS  Spectra [id number]
33yM w/ PMH of acute sigmoid diverticulitis in 2022 (treated with IV ABX alone),  who presents with acute onset LLQ and rectal pain, and presents to ED with leukocytosis and CT A/P showing acute sigmoid diverticulitis with extensive inflammatory change and phlegmon. Physical exam findings, imaging, and labs as documented above.     #Acute sigmoid diverticulitis with extensive inflammatory change and possible phlegmon. No defined abscess.   -Abdominal exam benign. Passing gas, having BMs.     #Leukocytosis, due to above    #Hx sigmoid diverticulitis in 2022, admitted to [facility] and treated with IV ABX. Discharged on oral cipro/flagyl. No Hx prior colonoscopy.       PLAN:  - NPO, IVF  - Zosyn   - Hemodynamic monitoring  - Ambulation, IS  - Does not take any home medications   - Serial abdominal exams   -no other consults at this time   - Discussed possibility of laparoscopic sigmoidectomy at a later time after acute inflammation subsides, schedule as outpatient   - Lovenox for DVT PPx     [phone]

## 2023-06-04 DIAGNOSIS — D72.829 ELEVATED WHITE BLOOD CELL COUNT, UNSPECIFIED: ICD-10-CM

## 2023-06-04 DIAGNOSIS — K57.92 DIVERTICULITIS OF INTESTINE, PART UNSPECIFIED, WITHOUT PERFORATION OR ABSCESS WITHOUT BLEEDING: ICD-10-CM

## 2023-10-11 NOTE — PATIENT PROFILE ADULT - CENTRAL VENOUS CATHETER/PICC LINE
GENERAL PRE-PROCEDURE:   Procedure:  Right heart cath and pericardiocentesis  Date/Time:  10/11/2023 1:40 PM    Verbal consent obtained?: Yes    Written consent obtained?: Yes    Risks and benefits: Risks, benefits and alternatives were discussed    Consent given by:  Patient  Patient states understanding of procedure being performed: Yes    Patient's understanding of procedure matches consent: Yes    Procedure consent matches procedure scheduled: Yes    Expected level of sedation:  Moderate  Appropriately NPO:  Yes  ASA Class:  3  Mallampati  :  Grade 2- soft palate, base of uvula, tonsillar pillars, and portion of posterior pharyngeal wall visible  Lungs:  Lungs clear with good breath sounds bilaterally  Heart:  Normal heart sounds and rate  History & Physical reviewed:  History and physical reviewed and no updates needed  Statement of review:  I have reviewed the lab findings, diagnostic data, medications, and the plan for sedation     no

## 2025-05-02 ENCOUNTER — EMERGENCY (EMERGENCY)
Facility: HOSPITAL | Age: 35
LOS: 0 days | Discharge: HOME | End: 2025-05-02
Attending: EMERGENCY MEDICINE
Payer: SELF-PAY

## 2025-05-02 VITALS
RESPIRATION RATE: 18 BRPM | DIASTOLIC BLOOD PRESSURE: 84 MMHG | SYSTOLIC BLOOD PRESSURE: 127 MMHG | TEMPERATURE: 98 F | OXYGEN SATURATION: 98 % | HEART RATE: 62 BPM | WEIGHT: 235.01 LBS

## 2025-05-02 DIAGNOSIS — F17.200 NICOTINE DEPENDENCE, UNSPECIFIED, UNCOMPLICATED: ICD-10-CM
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
## 2025-05-02 DIAGNOSIS — M54.50 LOW BACK PAIN, UNSPECIFIED: ICD-10-CM
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
## 2025-05-02 DIAGNOSIS — K57.90 DIVERTICULOSIS OF INTESTINE, PART UNSPECIFIED, WITHOUT PERFORATION OR ABSCESS WITHOUT BLEEDING: ICD-10-CM

## 2025-05-02 PROBLEM — K57.32 DIVERTICULITIS OF LARGE INTESTINE WITHOUT PERFORATION OR ABSCESS WITHOUT BLEEDING: Chronic | Status: ACTIVE | Noted: 2023-05-28

## 2025-05-02 PROCEDURE — 99283 EMERGENCY DEPT VISIT LOW MDM: CPT | Mod: 25

## 2025-05-02 PROCEDURE — 99284 EMERGENCY DEPT VISIT MOD MDM: CPT

## 2025-05-02 PROCEDURE — 96372 THER/PROPH/DIAG INJ SC/IM: CPT

## 2025-05-02 RX ORDER — LIDOCAINE HYDROCHLORIDE 20 MG/ML
1 JELLY TOPICAL
Qty: 5 | Refills: 0
Start: 2025-05-02 | End: 2025-05-06

## 2025-05-02 RX ORDER — KETOROLAC TROMETHAMINE 30 MG/ML
60 INJECTION, SOLUTION INTRAMUSCULAR; INTRAVENOUS ONCE
Refills: 0 | Status: DISCONTINUED | OUTPATIENT
Start: 2025-05-02 | End: 2025-05-02

## 2025-05-02 RX ORDER — TIZANIDINE 4 MG/1
1 TABLET ORAL
Qty: 21 | Refills: 0
Start: 2025-05-02 | End: 2025-05-08

## 2025-05-02 RX ORDER — LIDOCAINE HYDROCHLORIDE 20 MG/ML
1 JELLY TOPICAL ONCE
Refills: 0 | Status: COMPLETED | OUTPATIENT
Start: 2025-05-02 | End: 2025-05-02

## 2025-05-02 RX ORDER — IBUPROFEN 200 MG
1 TABLET ORAL
Qty: 21 | Refills: 0
Start: 2025-05-02 | End: 2025-05-08

## 2025-05-02 RX ORDER — METHOCARBAMOL 500 MG/1
1000 TABLET, FILM COATED ORAL ONCE
Refills: 0 | Status: COMPLETED | OUTPATIENT
Start: 2025-05-02 | End: 2025-05-02

## 2025-05-02 RX ADMIN — KETOROLAC TROMETHAMINE 60 MILLIGRAM(S): 30 INJECTION, SOLUTION INTRAMUSCULAR; INTRAVENOUS at 12:23

## 2025-05-02 RX ADMIN — METHOCARBAMOL 1000 MILLIGRAM(S): 500 TABLET, FILM COATED ORAL at 12:23

## 2025-05-02 RX ADMIN — LIDOCAINE HYDROCHLORIDE 1 PATCH: 20 JELLY TOPICAL at 12:23

## 2025-05-02 NOTE — ED PROVIDER NOTE - OBJECTIVE STATEMENT
Patient is a 35-year-old male with history of diverticulosis presents with atraumatic lower back pain which began 3 days ago.  Patient thinks the pain began from sitting down all day long on a chair without back support.  Woke up the next morning and the pain was acutely worse.  Went to another hospital and was prescribed Robaxin.  Has been taking the med along with Tylenol with minimal relief.  Denies weakness numbness or urinary issues.  Denies fevers or chills.  Denies previous back issues.  Denies IV drug abuse or diabetes.

## 2025-05-02 NOTE — ED PROVIDER NOTE - PATIENT PORTAL LINK FT
You can access the FollowMyHealth Patient Portal offered by [facility] by registering at the following website: [email or web address]. By joining [employer]’s FollowMyHealth portal, you will also be able to view your health information using other applications (apps) compatible with our system.

## 2025-05-02 NOTE — ED PROVIDER NOTE - PHYSICAL EXAMINATION
CONST: Well appearing in NAD   NECK: Non-tender, no meningeal signs  RESP: Equal BS B/L, No wheezes, rhonchi or rales. No distress  GI: Soft, non-tender, non-distended.  MS: Normal ROM in all extremities. No midline spinal tenderness. Pain on ROM of lower back noted  SKIN: Warm, dry, no acute rashes. Good turgor  NEURO: A&Ox3, No focal deficits. Strength 5/5 with no sensory deficits. Steady gait within normal limits

## 2025-05-02 NOTE — ED PROVIDER NOTE - ATTENDING APP SHARED VISIT CONTRIBUTION OF CARE
36 yo M no pmh presents with back pain. States that a few days ago he started to have lower back pain. Turned in his sleep and the pain worsened. went to [facility] yesterday and was given robaxin which he took with minimal relief. no change in urination. no fevers. able to ambulate.     CONSTITUTIONAL: Well-developed; well-nourished; in no acute distress.   SKIN: warm, dry  HEAD: Normocephalic; atraumatic.  EYES: PERRL, EOMI, no conjunctival erythema  ENT: No nasal discharge; airway clear.  NECK: Supple; non tender.  EXT: Normal ROM.  5/5 strength in all 4 extremities. + bl lumbosacral tenderness.   LYMPH: No acute cervical adenopathy.  NEURO: Alert, oriented, grossly unremarkable. neurovascularly intact  PSYCH: Cooperative, appropriate.

## 2025-05-02 NOTE — ED ADULT NURSE NOTE - CHIEF COMPLAINT QUOTE
C/o low back pain since Wednesday after twisting wrong, felt a pop, no painful urination , denies radiating abd pain. On Robaxin prescribed from [facility] w/ minimal relief

## 2025-05-02 NOTE — ED ADULT TRIAGE NOTE - CHIEF COMPLAINT QUOTE
C/o low back pain since Wednesday after twisting wrong, felt a pop, no painful urination , denies radiating abd pain. On Robaxin prescribed from [facility] w/ minimal relief